# Patient Record
Sex: MALE | Race: BLACK OR AFRICAN AMERICAN | NOT HISPANIC OR LATINO | Employment: FULL TIME | URBAN - NONMETROPOLITAN AREA
[De-identification: names, ages, dates, MRNs, and addresses within clinical notes are randomized per-mention and may not be internally consistent; named-entity substitution may affect disease eponyms.]

---

## 2025-07-11 ENCOUNTER — APPOINTMENT (OUTPATIENT)
Dept: RADIOLOGY | Facility: HOSPITAL | Age: 20
End: 2025-07-11
Payer: COMMERCIAL

## 2025-07-11 ENCOUNTER — ANESTHESIA EVENT (OUTPATIENT)
Dept: OPERATING ROOM | Facility: HOSPITAL | Age: 20
End: 2025-07-11

## 2025-07-11 ENCOUNTER — HOSPITAL ENCOUNTER (OUTPATIENT)
Facility: HOSPITAL | Age: 20
Discharge: HOME | End: 2025-07-11
Attending: EMERGENCY MEDICINE
Payer: COMMERCIAL

## 2025-07-11 ENCOUNTER — HOSPITAL ENCOUNTER (OUTPATIENT)
Facility: HOSPITAL | Age: 20
Setting detail: OUTPATIENT SURGERY
End: 2025-07-11
Attending: SURGERY | Admitting: SURGERY

## 2025-07-11 ENCOUNTER — ANESTHESIA (OUTPATIENT)
Dept: OPERATING ROOM | Facility: HOSPITAL | Age: 20
End: 2025-07-11

## 2025-07-11 VITALS
RESPIRATION RATE: 15 BRPM | HEART RATE: 56 BPM | SYSTOLIC BLOOD PRESSURE: 118 MMHG | OXYGEN SATURATION: 100 % | DIASTOLIC BLOOD PRESSURE: 71 MMHG | TEMPERATURE: 99.2 F | WEIGHT: 180 LBS | BODY MASS INDEX: 24.38 KG/M2 | HEIGHT: 72 IN

## 2025-07-11 DIAGNOSIS — K35.30 ACUTE APPENDICITIS WITH LOCALIZED PERITONITIS, WITHOUT PERFORATION, ABSCESS, OR GANGRENE: Primary | ICD-10-CM

## 2025-07-11 DIAGNOSIS — G89.18 POST-OPERATIVE PAIN: Primary | ICD-10-CM

## 2025-07-11 DIAGNOSIS — K35.80 ACUTE APPENDICITIS, UNSPECIFIED ACUTE APPENDICITIS TYPE: ICD-10-CM

## 2025-07-11 LAB
ALBUMIN SERPL BCP-MCNC: 5 G/DL (ref 3.4–5)
ALP SERPL-CCNC: 69 U/L (ref 33–120)
ALT SERPL W P-5'-P-CCNC: 18 U/L (ref 10–52)
ANION GAP SERPL CALC-SCNC: 14 MMOL/L (ref 10–20)
APPEARANCE UR: CLEAR
AST SERPL W P-5'-P-CCNC: 17 U/L (ref 9–39)
BASOPHILS # BLD AUTO: 0.03 X10*3/UL (ref 0–0.1)
BASOPHILS NFR BLD AUTO: 0.3 %
BILIRUB SERPL-MCNC: 0.6 MG/DL (ref 0–1.2)
BILIRUB UR STRIP.AUTO-MCNC: NEGATIVE MG/DL
BUN SERPL-MCNC: 16 MG/DL (ref 6–23)
CALCIUM SERPL-MCNC: 9.7 MG/DL (ref 8.6–10.3)
CHLORIDE SERPL-SCNC: 104 MMOL/L (ref 98–107)
CO2 SERPL-SCNC: 25 MMOL/L (ref 21–32)
COLOR UR: ABNORMAL
CREAT SERPL-MCNC: 1.2 MG/DL (ref 0.5–1.3)
EGFRCR SERPLBLD CKD-EPI 2021: 88 ML/MIN/1.73M*2
EOSINOPHIL # BLD AUTO: 0.14 X10*3/UL (ref 0–0.7)
EOSINOPHIL NFR BLD AUTO: 1.4 %
ERYTHROCYTE [DISTWIDTH] IN BLOOD BY AUTOMATED COUNT: 12.1 % (ref 11.5–14.5)
GLUCOSE SERPL-MCNC: 107 MG/DL (ref 74–99)
GLUCOSE UR STRIP.AUTO-MCNC: NORMAL MG/DL
HCT VFR BLD AUTO: 43 % (ref 41–52)
HGB BLD-MCNC: 15.2 G/DL (ref 13.5–17.5)
IMM GRANULOCYTES # BLD AUTO: 0.03 X10*3/UL (ref 0–0.7)
IMM GRANULOCYTES NFR BLD AUTO: 0.3 % (ref 0–0.9)
KETONES UR STRIP.AUTO-MCNC: NEGATIVE MG/DL
LEUKOCYTE ESTERASE UR QL STRIP.AUTO: NEGATIVE
LYMPHOCYTES # BLD AUTO: 1.24 X10*3/UL (ref 1.2–4.8)
LYMPHOCYTES NFR BLD AUTO: 12.5 %
MCH RBC QN AUTO: 29.7 PG (ref 26–34)
MCHC RBC AUTO-ENTMCNC: 35.3 G/DL (ref 32–36)
MCV RBC AUTO: 84 FL (ref 80–100)
MONOCYTES # BLD AUTO: 0.75 X10*3/UL (ref 0.1–1)
MONOCYTES NFR BLD AUTO: 7.6 %
MUCOUS THREADS #/AREA URNS AUTO: NORMAL /LPF
NEUTROPHILS # BLD AUTO: 7.7 X10*3/UL (ref 1.2–7.7)
NEUTROPHILS NFR BLD AUTO: 77.9 %
NITRITE UR QL STRIP.AUTO: NEGATIVE
NRBC BLD-RTO: 0 /100 WBCS (ref 0–0)
PH UR STRIP.AUTO: 7.5 [PH]
PLATELET # BLD AUTO: 208 X10*3/UL (ref 150–450)
POTASSIUM SERPL-SCNC: 3.7 MMOL/L (ref 3.5–5.3)
PROT SERPL-MCNC: 8 G/DL (ref 6.4–8.2)
PROT UR STRIP.AUTO-MCNC: ABNORMAL MG/DL
RBC # BLD AUTO: 5.12 X10*6/UL (ref 4.5–5.9)
RBC # UR STRIP.AUTO: NEGATIVE MG/DL
RBC #/AREA URNS AUTO: NORMAL /HPF
SODIUM SERPL-SCNC: 139 MMOL/L (ref 136–145)
SP GR UR STRIP.AUTO: >1.05
UROBILINOGEN UR STRIP.AUTO-MCNC: NORMAL MG/DL
WBC # BLD AUTO: 9.9 X10*3/UL (ref 4.4–11.3)
WBC #/AREA URNS AUTO: NORMAL /HPF

## 2025-07-11 PROCEDURE — 2500000004 HC RX 250 GENERAL PHARMACY W/ HCPCS (ALT 636 FOR OP/ED): Performed by: ANESTHESIOLOGY

## 2025-07-11 PROCEDURE — 85025 COMPLETE CBC W/AUTO DIFF WBC: CPT | Performed by: EMERGENCY MEDICINE

## 2025-07-11 PROCEDURE — 96375 TX/PRO/DX INJ NEW DRUG ADDON: CPT | Mod: 59

## 2025-07-11 PROCEDURE — 2550000001 HC RX 255 CONTRASTS: Performed by: EMERGENCY MEDICINE

## 2025-07-11 PROCEDURE — 88304 TISSUE EXAM BY PATHOLOGIST: CPT | Performed by: PATHOLOGY

## 2025-07-11 PROCEDURE — 7100000009 HC PHASE TWO TIME - INITIAL BASE CHARGE: Performed by: SURGERY

## 2025-07-11 PROCEDURE — 88304 TISSUE EXAM BY PATHOLOGIST: CPT | Mod: TC,SUR,SAMLAB | Performed by: SURGERY

## 2025-07-11 PROCEDURE — 99285 EMERGENCY DEPT VISIT HI MDM: CPT | Mod: 25 | Performed by: EMERGENCY MEDICINE

## 2025-07-11 PROCEDURE — 3700000001 HC GENERAL ANESTHESIA TIME - INITIAL BASE CHARGE: Performed by: SURGERY

## 2025-07-11 PROCEDURE — 7100000010 HC PHASE TWO TIME - EACH INCREMENTAL 1 MINUTE: Performed by: SURGERY

## 2025-07-11 PROCEDURE — 96365 THER/PROPH/DIAG IV INF INIT: CPT | Mod: 59

## 2025-07-11 PROCEDURE — 3600000008 HC OR TIME - EACH INCREMENTAL 1 MINUTE - PROCEDURE LEVEL THREE: Performed by: SURGERY

## 2025-07-11 PROCEDURE — 3600000003 HC OR TIME - INITIAL BASE CHARGE - PROCEDURE LEVEL THREE: Performed by: SURGERY

## 2025-07-11 PROCEDURE — 44970 LAPAROSCOPY APPENDECTOMY: CPT | Performed by: SURGERY

## 2025-07-11 PROCEDURE — 7100000002 HC RECOVERY ROOM TIME - EACH INCREMENTAL 1 MINUTE: Performed by: SURGERY

## 2025-07-11 PROCEDURE — 2500000005 HC RX 250 GENERAL PHARMACY W/O HCPCS: Performed by: ANESTHESIOLOGY

## 2025-07-11 PROCEDURE — 2500000004 HC RX 250 GENERAL PHARMACY W/ HCPCS (ALT 636 FOR OP/ED): Performed by: SURGERY

## 2025-07-11 PROCEDURE — 2720000007 HC OR 272 NO HCPCS: Performed by: SURGERY

## 2025-07-11 PROCEDURE — 2500000004 HC RX 250 GENERAL PHARMACY W/ HCPCS (ALT 636 FOR OP/ED): Performed by: EMERGENCY MEDICINE

## 2025-07-11 PROCEDURE — 36415 COLL VENOUS BLD VENIPUNCTURE: CPT | Performed by: EMERGENCY MEDICINE

## 2025-07-11 PROCEDURE — 80053 COMPREHEN METABOLIC PANEL: CPT | Performed by: EMERGENCY MEDICINE

## 2025-07-11 PROCEDURE — 96361 HYDRATE IV INFUSION ADD-ON: CPT | Mod: 59

## 2025-07-11 PROCEDURE — 3700000002 HC GENERAL ANESTHESIA TIME - EACH INCREMENTAL 1 MINUTE: Performed by: SURGERY

## 2025-07-11 PROCEDURE — 2500000001 HC RX 250 WO HCPCS SELF ADMINISTERED DRUGS (ALT 637 FOR MEDICARE OP): Performed by: ANESTHESIOLOGY

## 2025-07-11 PROCEDURE — 7100000001 HC RECOVERY ROOM TIME - INITIAL BASE CHARGE: Performed by: SURGERY

## 2025-07-11 PROCEDURE — 74177 CT ABD & PELVIS W/CONTRAST: CPT | Mod: FOREIGN READ | Performed by: RADIOLOGY

## 2025-07-11 PROCEDURE — 74177 CT ABD & PELVIS W/CONTRAST: CPT

## 2025-07-11 PROCEDURE — 81003 URINALYSIS AUTO W/O SCOPE: CPT | Performed by: EMERGENCY MEDICINE

## 2025-07-11 RX ORDER — ROCURONIUM BROMIDE 10 MG/ML
INJECTION, SOLUTION INTRAVENOUS AS NEEDED
Status: DISCONTINUED | OUTPATIENT
Start: 2025-07-11 | End: 2025-07-11

## 2025-07-11 RX ORDER — GLYCOPYRROLATE 0.2 MG/ML
INJECTION INTRAMUSCULAR; INTRAVENOUS AS NEEDED
Status: DISCONTINUED | OUTPATIENT
Start: 2025-07-11 | End: 2025-07-11

## 2025-07-11 RX ORDER — LIDOCAINE HYDROCHLORIDE 20 MG/ML
INJECTION, SOLUTION INFILTRATION; PERINEURAL AS NEEDED
Status: DISCONTINUED | OUTPATIENT
Start: 2025-07-11 | End: 2025-07-11

## 2025-07-11 RX ORDER — MIDAZOLAM HYDROCHLORIDE 2 MG/2ML
2 INJECTION, SOLUTION INTRAMUSCULAR; INTRAVENOUS ONCE AS NEEDED
Status: DISCONTINUED | OUTPATIENT
Start: 2025-07-11 | End: 2025-07-11 | Stop reason: HOSPADM

## 2025-07-11 RX ORDER — SODIUM CHLORIDE, SODIUM LACTATE, POTASSIUM CHLORIDE, CALCIUM CHLORIDE 600; 310; 30; 20 MG/100ML; MG/100ML; MG/100ML; MG/100ML
125 INJECTION, SOLUTION INTRAVENOUS CONTINUOUS
Status: DISCONTINUED | OUTPATIENT
Start: 2025-07-11 | End: 2025-07-12 | Stop reason: HOSPADM

## 2025-07-11 RX ORDER — ONDANSETRON HYDROCHLORIDE 2 MG/ML
INJECTION, SOLUTION INTRAVENOUS AS NEEDED
Status: DISCONTINUED | OUTPATIENT
Start: 2025-07-11 | End: 2025-07-11

## 2025-07-11 RX ORDER — KETOROLAC TROMETHAMINE 30 MG/ML
INJECTION, SOLUTION INTRAMUSCULAR; INTRAVENOUS AS NEEDED
Status: DISCONTINUED | OUTPATIENT
Start: 2025-07-11 | End: 2025-07-11

## 2025-07-11 RX ORDER — BUPIVACAINE HYDROCHLORIDE 2.5 MG/ML
INJECTION, SOLUTION INFILTRATION; PERINEURAL AS NEEDED
Status: DISCONTINUED | OUTPATIENT
Start: 2025-07-11 | End: 2025-07-11 | Stop reason: HOSPADM

## 2025-07-11 RX ORDER — ONDANSETRON HYDROCHLORIDE 2 MG/ML
4 INJECTION, SOLUTION INTRAVENOUS ONCE
Status: COMPLETED | OUTPATIENT
Start: 2025-07-11 | End: 2025-07-11

## 2025-07-11 RX ORDER — PROPOFOL 10 MG/ML
INJECTION, EMULSION INTRAVENOUS AS NEEDED
Status: DISCONTINUED | OUTPATIENT
Start: 2025-07-11 | End: 2025-07-11

## 2025-07-11 RX ORDER — SUCCINYLCHOLINE CHLORIDE 100 MG/5ML
SYRINGE (ML) INTRAVENOUS AS NEEDED
Status: DISCONTINUED | OUTPATIENT
Start: 2025-07-11 | End: 2025-07-11

## 2025-07-11 RX ORDER — PROCHLORPERAZINE EDISYLATE 5 MG/ML
10 INJECTION INTRAMUSCULAR; INTRAVENOUS ONCE AS NEEDED
Status: DISCONTINUED | OUTPATIENT
Start: 2025-07-11 | End: 2025-07-12 | Stop reason: HOSPADM

## 2025-07-11 RX ORDER — LIDOCAINE HYDROCHLORIDE 40 MG/ML
SOLUTION TOPICAL AS NEEDED
Status: DISCONTINUED | OUTPATIENT
Start: 2025-07-11 | End: 2025-07-11

## 2025-07-11 RX ORDER — OXYCODONE HYDROCHLORIDE 5 MG/1
5 TABLET ORAL ONCE
Refills: 0 | Status: COMPLETED | OUTPATIENT
Start: 2025-07-11 | End: 2025-07-11

## 2025-07-11 RX ORDER — OXYCODONE AND ACETAMINOPHEN 5; 325 MG/1; MG/1
1 TABLET ORAL EVERY 6 HOURS PRN
Qty: 8 TABLET | Refills: 0 | Status: SHIPPED | OUTPATIENT
Start: 2025-07-11 | End: 2025-07-13

## 2025-07-11 RX ORDER — CEFOXITIN SODIUM 2 G/50ML
2 INJECTION, SOLUTION INTRAVENOUS ONCE
Status: DISCONTINUED | OUTPATIENT
Start: 2025-07-11 | End: 2025-07-11 | Stop reason: HOSPADM

## 2025-07-11 RX ORDER — NEOSTIGMINE METHYLSULFATE 1 MG/ML
INJECTION INTRAVENOUS AS NEEDED
Status: DISCONTINUED | OUTPATIENT
Start: 2025-07-11 | End: 2025-07-11

## 2025-07-11 RX ORDER — FENTANYL CITRATE 50 UG/ML
INJECTION, SOLUTION INTRAMUSCULAR; INTRAVENOUS AS NEEDED
Status: DISCONTINUED | OUTPATIENT
Start: 2025-07-11 | End: 2025-07-11

## 2025-07-11 RX ORDER — SODIUM CHLORIDE, SODIUM LACTATE, POTASSIUM CHLORIDE, CALCIUM CHLORIDE 600; 310; 30; 20 MG/100ML; MG/100ML; MG/100ML; MG/100ML
100 INJECTION, SOLUTION INTRAVENOUS CONTINUOUS
Status: DISCONTINUED | OUTPATIENT
Start: 2025-07-11 | End: 2025-07-12 | Stop reason: HOSPADM

## 2025-07-11 RX ORDER — MORPHINE SULFATE 4 MG/ML
4 INJECTION, SOLUTION INTRAMUSCULAR; INTRAVENOUS ONCE
Status: COMPLETED | OUTPATIENT
Start: 2025-07-11 | End: 2025-07-11

## 2025-07-11 RX ADMIN — ONDANSETRON 4 MG: 2 INJECTION, SOLUTION INTRAMUSCULAR; INTRAVENOUS at 21:41

## 2025-07-11 RX ADMIN — Medication 8 L/MIN: at 22:00

## 2025-07-11 RX ADMIN — IOHEXOL 68 ML: 350 INJECTION, SOLUTION INTRAVENOUS at 18:38

## 2025-07-11 RX ADMIN — MORPHINE SULFATE 4 MG: 4 INJECTION, SOLUTION INTRAMUSCULAR; INTRAVENOUS at 17:42

## 2025-07-11 RX ADMIN — SODIUM CHLORIDE, SODIUM LACTATE, POTASSIUM CHLORIDE, AND CALCIUM CHLORIDE 100 ML/HR: .6; .31; .03; .02 INJECTION, SOLUTION INTRAVENOUS at 20:52

## 2025-07-11 RX ADMIN — Medication 100 MG: at 20:59

## 2025-07-11 RX ADMIN — PROPOFOL 150 MG: 10 INJECTION, EMULSION INTRAVENOUS at 20:59

## 2025-07-11 RX ADMIN — HYDROMORPHONE HYDROCHLORIDE 0.5 MG: 2 INJECTION, SOLUTION INTRAMUSCULAR; INTRAVENOUS; SUBCUTANEOUS at 21:34

## 2025-07-11 RX ADMIN — KETOROLAC TROMETHAMINE 30 MG: 30 INJECTION, SOLUTION INTRAMUSCULAR; INTRAVENOUS at 21:38

## 2025-07-11 RX ADMIN — ROCURONIUM BROMIDE 10 MG: 10 INJECTION INTRAVENOUS at 21:13

## 2025-07-11 RX ADMIN — ONDANSETRON 4 MG: 2 INJECTION INTRAMUSCULAR; INTRAVENOUS at 17:40

## 2025-07-11 RX ADMIN — LIDOCAINE HYDROCHLORIDE 50 MG: 20 INJECTION, SOLUTION INFILTRATION; PERINEURAL at 20:56

## 2025-07-11 RX ADMIN — ROCURONIUM BROMIDE 20 MG: 10 INJECTION INTRAVENOUS at 21:08

## 2025-07-11 RX ADMIN — HYDROMORPHONE HYDROCHLORIDE 0.5 MG: 2 INJECTION, SOLUTION INTRAMUSCULAR; INTRAVENOUS; SUBCUTANEOUS at 21:15

## 2025-07-11 RX ADMIN — GLYCOPYRROLATE 0.2 MG: 0.2 INJECTION, SOLUTION INTRAMUSCULAR; INTRAVENOUS at 21:45

## 2025-07-11 RX ADMIN — LIDOCAINE HYDROCHLORIDE 4 ML: 40 SOLUTION TOPICAL at 21:00

## 2025-07-11 RX ADMIN — OXYCODONE HYDROCHLORIDE 5 MG: 5 TABLET ORAL at 22:40

## 2025-07-11 RX ADMIN — FENTANYL CITRATE 100 MCG: 50 INJECTION, SOLUTION INTRAMUSCULAR; INTRAVENOUS at 20:56

## 2025-07-11 RX ADMIN — NEOSTIGMINE METHYLSULFATE 2 MG: 1 INJECTION, SOLUTION INTRAVENOUS at 21:45

## 2025-07-11 RX ADMIN — ROCURONIUM BROMIDE 10 MG: 10 INJECTION INTRAVENOUS at 20:59

## 2025-07-11 RX ADMIN — PIPERACILLIN SODIUM AND TAZOBACTAM SODIUM 3.38 G: 3; .375 INJECTION, SOLUTION INTRAVENOUS at 19:00

## 2025-07-11 RX ADMIN — SODIUM CHLORIDE 1000 ML: 0.9 INJECTION, SOLUTION INTRAVENOUS at 17:39

## 2025-07-11 SDOH — HEALTH STABILITY: MENTAL HEALTH: CURRENT SMOKER: 0

## 2025-07-11 ASSESSMENT — PAIN SCALES - GENERAL
PAINLEVEL_OUTOF10: 0 - NO PAIN
PAINLEVEL_OUTOF10: 7
PAINLEVEL_OUTOF10: 0 - NO PAIN
PAINLEVEL_OUTOF10: 7
PAINLEVEL_OUTOF10: 0 - NO PAIN

## 2025-07-11 ASSESSMENT — PAIN - FUNCTIONAL ASSESSMENT
PAIN_FUNCTIONAL_ASSESSMENT: 0-10

## 2025-07-11 ASSESSMENT — PAIN DESCRIPTION - LOCATION: LOCATION: ABDOMEN

## 2025-07-11 NOTE — ED PROVIDER NOTES
HPI   No chief complaint on file.      Limitations to History: None    HPI: 20-year-old male presents concern for lower abdominal pain that began approximately 6 hours ago.  Sharp in nature.  Primarily in his right lower quadrant.  1 episode of vomiting.  Nausea.  Denies any changes in urinary or bowel habits.  Denies any fall or trauma.    Additional History Obtained from: Girlfriend at bedside.    ------------------------------------------------------------------------------------------------------------------------------------------  Physical Exam:    VS: As documented in the triage note and EMR flowsheet from this visit were reviewed.    Appearance: Alert. cooperative,  in no acute distress.   Skin: Intact,  dry skin, no lesions, rash, petechiae or purpura.   HENT: Normocephalic, atraumatic. Nares patent. No intraoral lesions.   Neck: Supple, without meningismus. Trachea at midline. No lymphadenopathy.  Pulmonary: Clear bilaterally with good chest wall excursion. No rales, rhonchi or wheezing. No accessory muscle use or stridor.  Cardiac: Regular rate and rhythm, no rubs, murmurs, or gallops.   Abdomen: Abdomen is soft.  Tenderness to palpation in the right lower quadrant and suprapubic area.  Nondistended.  No palpable organomegaly.  No rebound or guarding.  No CVA tenderness. Nonsurgical abdomen.  Genitourinary: Exam deferred.  Musculoskeletal: Full range of motion.  Pulses full and equal. No cyanosis, clubbing, or edema.  Psychiatric: Appropriate mood and affect.                Patient History   Medical History[1]  Surgical History[2]  Family History[3]  Social History[4]    Physical Exam   ED Triage Vitals   Temp Pulse Resp BP   -- -- -- --      SpO2 Temp src Heart Rate Source Patient Position   -- -- -- --      BP Location FiO2 (%)     -- --       Physical Exam      ED Course & MDM   Diagnoses as of 07/12/25 1439   Acute appendicitis, unspecified acute appendicitis type                 No data recorded                                  Medical Decision Making  Labs Reviewed  COMPREHENSIVE METABOLIC PANEL - Abnormal     Glucose                       107 (*)                Sodium                        139                    Potassium                     3.7                    Chloride                      104                    Bicarbonate                   25                     Anion Gap                     14                     Urea Nitrogen                 16                     Creatinine                    1.20                   eGFR                          88                     Calcium                       9.7                    Albumin                       5.0                    Alkaline Phosphatase          69                     Total Protein                 8.0                    AST                           17                     Bilirubin, Total              0.6                    ALT                           18                  URINALYSIS WITH REFLEX CULTURE AND MICROSCOPIC - Abnormal     Color, Urine                                         Appearance, Urine             Clear                  Specific Gravity, Urine       >1.050 (*)               pH, Urine                     7.5                    Protein, Urine                20 (TRACE)                Glucose, Urine                Normal                 Blood, Urine                  NEGATIVE                Ketones, Urine                NEGATIVE                Bilirubin, Urine              NEGATIVE                Urobilinogen, Urine           Normal                 Nitrite, Urine                NEGATIVE                Leukocyte Esterase, Urine     NEGATIVE             CBC WITH AUTO DIFFERENTIAL     WBC                           9.9                    nRBC                          0.0                    RBC                           5.12                   Hemoglobin                    15.2                   Hematocrit                    43.0                    MCV                           84                     MCH                           29.7                   MCHC                          35.3                   RDW                           12.1                   Platelets                     208                    Neutrophils %                 77.9                   Immature Granulocytes %, Automated   0.3                    Lymphocytes %                 12.5                   Monocytes %                   7.6                    Eosinophils %                 1.4                    Basophils %                   0.3                    Neutrophils Absolute          7.70                   Immature Granulocytes Absolute, Au*   0.03                   Lymphocytes Absolute          1.24                   Monocytes Absolute            0.75                   Eosinophils Absolute          0.14                   Basophils Absolute            0.03                URINALYSIS WITH REFLEX CULTURE AND MICROSCOPIC         Narrative: The following orders were created for panel order Urinalysis with Reflex Culture and Microscopic.                  Procedure                               Abnormality         Status                                     ---------                               -----------         ------                                     Urinalysis with Reflex C...[[id number]]  Abnormal            Final result                               Extra Urine Gray Tube[[id number]]                                                                                         Please view results for these tests on the individual orders.  EXTRA URINE GRAY TUBE  SURGICAL PATHOLOGY EXAM  URINALYSIS MICROSCOPIC WITH REFLEX CULTURE  CT abdomen pelvis w IV contrast   Final Result    Findings consistent with uncomplicated acute appendicitis.     NOTIFICATION:  The critical results of the study were discussed with,    and acknowledged by Duke Esteban  by telephone on July 11, 2025 at     2000 hours.    Signed by Ras Craft MD     Medical Decision Making:    Patient appears in distress secondary to pain.  Treated with 1 L normal saline, morphine, Zofran.  Lab work shows no evidence of leukocytosis.  Afebrile.  CT interpreted by myself with concern for acute appendicitis.  Case discussed with general surgeon on-call, Dr. Strauss.  Patient treated with Zosyn.  Examined patient at the bedside.  Awaiting radiology interpretation but plan for appendectomy.  Stable.    Differential Diagnoses Considered: Acute appendicitis, UTI, kidney stone, musculoskeletal pain    Independent Interpretation of Studies:  I independently interpreted: CT shows concern for acute appendicitis with appendicolith.    Escalation of Care:  Appropriate for transfer the operating room for appendectomy.    Discussion of Management with Other Providers:   I discussed the patient/results with: General Surgeon.          Procedure  Procedures       [1] No past medical history on file.  [2] No past surgical history on file.  [3] No family history on file.  [4]   Social History  Tobacco Use    Smoking status: Not on file    Smokeless tobacco: Not on file   Substance Use Topics    Alcohol use: Not on file    Drug use: Not on file        Geo Harden,   07/12/25 2613

## 2025-07-11 NOTE — H&P
General Surgery H&P    Patient: Guy Holt  : 2005  MRN: [id number]  Date: 25    Primary Care Provider: No PCP  Referring Provider: Geo Florentino DO    Chief Complaint: RLQ abdominal pain    History of Present Illness: Guy Holt is a 20 y.o. old male seen at the request of Dr. Florentino for evaluation of acute appendicitis.  He presents to the emergency department with right lower quadrant abdominal pain.  Pain began around noon.  He vomited around 1 PM.  He denies any fever, sweats or chills.  He denies any similar pain in the past.  Pain is worse with movement.  He last ate prior to onset of pain.  He had chicken nuggets at 7 AM and a couple coffee at 9 AM.  He is otherwise healthy.  He has no known medical issues.  He has no previous abdominal surgery.  His BMI is 24.    Medical History:  No known medical issues    Surgical History:  No previous abdominal surgery.  Knee surgery    Home Medications:  No home medications.    Allergies:  No known allergies.    Family History:   No family history of colon or rectal cancer or inflammatory bowel disease.    Social History:  Vapes.  Social alcohol use.  No drug use.  He works in [employer], but his job does not involve much heavy lifting.    ROS:  Constitutional:  no fever, sweats, and chills  Cardiovascular: No chest pain  Respiratory: No cough or shortness of breath  Gastrointestinal: + RLQ abdominal pain that is worse with movement, vomiting  Genitourinary: no dysuria  Musculoskeletal: no weakness or swelling  Integumentary: no rashes  Neurological: no confusion  Endocrine: no heat or cold intolerance  Heme/Lymph: no easy bruising or bleeding    Objective:  /82   Pulse 66   Temp 36.4 °C (97.5 °F)   Resp 16   Wt 81.6 kg (180 lb)   SpO2 99%     Physical Exam:  Constitutional: No acute distress although appears uncomfortable, mother and girlfriend at bedside  Neurologic: alert and oriented  Psych: appropriate affect  Ears, Nose,  Mouth and Throat: mucus membranes moist  Pulmonary: No labored breathing, on room air  Cardiovascular: Regular rate and rhythm  Abdomen: soft, non-distended, BMI 24, no surgical scars, focal peritonitis in the right lower quadrant  Musculoskeletal: Moves all extremities, no edema  Skin: no jaundice    Labs:  WBC 9.9, Hgb 15.2, Plts 208  CMP within normal limits    Imaging:  CT ap reviewed: Dilated appendix with some hyperemia of the wall, with fecaliths, consistent with early acute appendicitis.    Assessment and Plan: Guy Holt is a 20 y.o. old male with acute appendicitis.  Risks, benefits and alternatives of laparoscopic appendectomy were discussed with the patient.  This included risk of bleeding, infection, viscus injury, incisional hernia, conversion to an open procedure, post-operative abscess, possible need for subsequent interventions.  He was agreeable to proceed with surgery.  He is NPO.  He received Zosyn in the Emergency Department.  Anticipate discharge home this evening following surgery.    Darya Strauss MD  7/11/2025

## 2025-07-12 NOTE — ED PROVIDER NOTES
Emergency Medicine Transition of Care Note.    I received Guy Holt in signout from Dr. Carlson.  Please see the previous ED provider note for all HPI, PE and MDM up to the time of signout at 1900. This is in addition to the primary record.  I spoke with Dr. Strauss.  Patient to go the OR for appendectomy and be discharged home.    In brief Guy Holt is an 20 y.o. male presenting for   Chief Complaint   Patient presents with    Abdominal Pain     Abdominal pain, nausea and vomiting.      At the time of signout we were awaiting: CT scan    Diagnoses as of 07/12/25 0540   Acute appendicitis, unspecified acute appendicitis type     Labs Reviewed   COMPREHENSIVE METABOLIC PANEL - Abnormal       Result Value    Glucose 107 (*)     Sodium 139      Potassium 3.7      Chloride 104      Bicarbonate 25      Anion Gap 14      Urea Nitrogen 16      Creatinine 1.20      eGFR 88      Calcium 9.7      Albumin 5.0      Alkaline Phosphatase 69      Total Protein 8.0      AST 17      Bilirubin, Total 0.6      ALT 18     CBC WITH AUTO DIFFERENTIAL    WBC 9.9      nRBC 0.0      RBC 5.12      Hemoglobin 15.2      Hematocrit 43.0      MCV 84      MCH 29.7      MCHC 35.3      RDW 12.1      Platelets 208      Neutrophils % 77.9      Immature Granulocytes %, Automated 0.3      Lymphocytes % 12.5      Monocytes % 7.6      Eosinophils % 1.4      Basophils % 0.3      Neutrophils Absolute 7.70      Immature Granulocytes Absolute, Automated 0.03      Lymphocytes Absolute 1.24      Monocytes Absolute 0.75      Eosinophils Absolute 0.14      Basophils Absolute 0.03     URINALYSIS WITH REFLEX CULTURE AND MICROSCOPIC    Narrative:     The following orders were created for panel order Urinalysis with Reflex Culture and Microscopic.  Procedure                               Abnormality         Status                     ---------                               -----------         ------                     Urinalysis with Reflex  C...[[id number]]                                                 Extra Urine Gray Tube[[id number]]                                                         Please view results for these tests on the individual orders.   URINALYSIS WITH REFLEX CULTURE AND MICROSCOPIC   EXTRA URINE GRAY TUBE      CT abdomen pelvis w IV contrast   Final Result   Findings consistent with uncomplicated acute appendicitis.    NOTIFICATION:  The critical results of the study were discussed with,   and acknowledged by Duke Esteban  by telephone on July 11, 2025 at   2000 hours.   Signed by Ras Craft MD         Medical Decision Making  The patient will be taken to the OR for an appendectomy and discharged per Dr. Strauss.    Final diagnoses:   [K35.30] Acute appendicitis with localized peritonitis, without perforation, abscess, or gangrene           Procedure  Procedures    DO Duke Armstrong DO  07/2005       Duke Ayala DO  07/12/25 0564

## 2025-07-12 NOTE — ANESTHESIA PROCEDURE NOTES
Airway  Date/Time: 7/11/2025 9:00 PM  Reason: elective    Airway not difficult    Staffing  Performed: attending   Authorized by: Tray Oconnor MD PhD    Performed by: Tray Oconnor MD PhD  Patient location during procedure: OR    Patient Condition  Indications for airway management: anesthesia and airway protection  MILS maintained throughout  Sedation level: deep     Final Airway Details   Preoxygenated: yes  Final airway type: endotracheal airway  Successful airway: ETT  Cuffed: yes   Successful intubation technique: direct laryngoscopy  Adjuncts used in placement: intubating stylet  Endotracheal tube insertion site: oral  Blade: Diana  Blade size: #4  ETT size (mm): 7.5  Cormack-Lehane Classification: grade IIa - partial view of glottis  Placement verified by: chest auscultation and capnometry   Number of attempts at approach: 1

## 2025-07-12 NOTE — OP NOTE
[facility]  Operative Report    Patient: Guy Holt  : 2005  MRN: [id number]    Date of Operation: 25    Pre-Operative Diagnosis: Acute appendicitis  Post-Operative Diagnosis: Acute appendicitis  Procedure: Laparoscopic Appendectomy    Surgeon: Darya Strauss MD  Assistant: n/a    Anesthesia: General  Findings: Acute appendicitis  EBL: 15 ml  Specimen: Appendix  Case Type: Clean-Contaminated  Disposition: PACU / Stable    Indication: Patient is a 20 y.o. male who presented with right lower quadrant abdominal pain that began earlier in the day.  CT scan showed acute appendicitis. Risks and benefits of appendectomy were discussed and the patient was agreeable to proceed with surgery.    Description: The patient was brought to the operating room and placed in supine position with left arm tucked. General anesthesia was induced. The patient's abdomen was prepped and draped. A Veress needle was placed in the left upper quadrant.  Attempted insufflation was made but this had high pressure consistent with preperitoneal placement.  The Veress needle attempt was then aborted.  An infraumbilical incision was created and a 5mm optical trocar was introduced. There was no evidence of injury at the trocar site.  The Veress site was confirmed to be preperitoneal.  The camera was changed to a 30 degree scope. The patient was placed in Trendelenburg position. A 12mm left lower quadrant and 5mm suprapubic port were placed under direct visualization after local anesthetic infiltration. The patient was rotated to the left.  The appendix was identified.  It was actually partially twisted around its own mesentery with dilation distal to where it was twisted.  The tip was very dilated and positioned towards the pelvis.  The appendix was grasped, straightened, and retracted. A window was created in the base of the mesentery using blunt dissection. The base of the appendix was transected with a blue load  of an Endo JOHN stapler. The mesentery was then transected with a white load of an Endo JOHN stapler.  He had some oozing from the staple line.  Judicious use of cautery was used for hemostasis.  The appendix was removed via Endo Catch bag and the 12mm port was closed with 0 Vicryl on a laparoscopic suture passer. Skin incisions were closed with 4-0 Vicryl and steri strips. The patient tolerated the procedure well, was extubated and transferred to PACU in stable condition.    Darya Strauss MD  7/11/2025

## 2025-07-12 NOTE — ANESTHESIA POSTPROCEDURE EVALUATION
Patient: Guy Holt    Procedure Summary       Date: 07/11/25 Room / Location: [facility] OR 03 / [facility] OR    Anesthesia Start: 2051 Anesthesia Stop: 2208    Procedure: Laparoscopic appendectomy, possible open (Abdomen) Diagnosis:       Acute appendicitis with localized peritonitis, without perforation, abscess, or gangrene      (Acute appendicitis with localized peritonitis, without perforation, abscess, or gangrene [K35.30])    Surgeons: Darya Strauss MD Responsible Provider: Tray Oconnor MD PhD    Anesthesia Type: general ASA Status: 1 - Emergent            Anesthesia Type: general    Vitals Value Taken Time   /65 07/11/25 22:30   Temp 37.3 °C (99.2 °F) 07/11/25 22:30   Pulse 57 07/11/25 22:30   Resp 17 07/11/25 22:30   SpO2 98 % 07/11/25 22:30       Anesthesia Post Evaluation    Patient location during evaluation: PACU  Patient participation: complete - patient participated  Level of consciousness: awake and alert  Pain management: satisfactory to patient  Airway patency: patent  Cardiovascular status: hemodynamically stable  Respiratory status: spontaneous ventilation, room air and nonlabored ventilation  Hydration status: euvolemic  Postoperative Nausea and Vomiting: none        No notable events documented.

## 2025-07-12 NOTE — ANESTHESIA PREPROCEDURE EVALUATION
Patient: Guy Holt    Procedure Information       Date/Time: 07/11/25 2045    Procedure: Laparoscopic appendectomy, possible open (Abdomen)    Location: [facility] OR 03 / [facility] OR    Surgeons: Darya Strauss MD            Relevant Problems   Anesthesia (within normal limits)      Cardiac (within normal limits)      Pulmonary   (-) Asthma   (-) Chronic obstructive pulmonary disease (Multi)   (-) TOD (obstructive sleep apnea)   (-) Recent URI      /Renal   (-) Chronic renal insufficiency   (-) ESRD (end stage renal disease) (Multi)   (-) Hemodialysis patient   (-) History of kidney transplant (HHS-HCC)   (-) Patient on peritoneal dialysis      Endocrine   (-) Diabetes mellitus type 1 (Multi)   (-) Diabetes mellitus, type 2 (Multi)   (-) Hyperthyroidism   (-) Hypothyroidism   (-) Obesity      Hematology   (-) Anticoagulant long-term use   (-) Coagulopathy (Multi)       Clinical information reviewed:   Tobacco  Allergies  Meds   Med Hx  Surg Hx   Fam Hx  Soc Hx        NPO Detail:  No data recorded     Physical Exam    Airway  Mallampati: I  TM distance: >3 FB  Mouth opening: 3 or more finger widths     Cardiovascular   Rhythm: regular  Rate: normal     Dental - normal exam     Pulmonary Breath sounds clear to auscultation     Abdominal            Anesthesia Plan    History of general anesthesia?: yes  History of complications of general anesthesia?: no    ASA 1 - emergent     general     The patient is not a current smoker.  Patient did not smoke on day of procedure.    intravenous induction   Anesthetic plan and risks discussed with patient.

## 2025-07-12 NOTE — DISCHARGE INSTRUCTIONS
HAD OXYCODONE AT 10:40 PM, MAY TAKE PRESCRIPTION PAIN MEDICATION AFTER 4:40 AM. SEE GENERAL ANESTHESIA AND CARE AFTER A LAPAROSCOPIC APPENDECTOMY FORMS PROVIDED. HAD OXYCODONE AT    PM, MAY TAKE AFTER       PM IF NEEDED.

## 2025-07-14 ENCOUNTER — TELEPHONE (OUTPATIENT)
Dept: SURGERY | Facility: CLINIC | Age: 20
End: 2025-07-14

## 2025-07-25 LAB
LABORATORY COMMENT REPORT: NORMAL
PATH REPORT.FINAL DX SPEC: NORMAL
PATH REPORT.GROSS SPEC: NORMAL
PATH REPORT.RELEVANT HX SPEC: NORMAL
PATH REPORT.TOTAL CANCER: NORMAL

## 2025-07-28 ENCOUNTER — APPOINTMENT (OUTPATIENT)
Dept: SURGERY | Facility: CLINIC | Age: 20
End: 2025-07-28

## 2025-07-28 DIAGNOSIS — Z48.89 POSTOPERATIVE VISIT: Primary | ICD-10-CM

## 2025-07-28 PROCEDURE — 99024 POSTOP FOLLOW-UP VISIT: CPT | Performed by: SURGERY

## 2025-07-28 NOTE — PROGRESS NOTES
General Surgery Post-Operative Visit    Patient: Guy Holt  : 2005  MRN: [id number]  Date of Visit: 25    Chief Complaint: s/p laparoscopic appendectomy    History of Present Illness: Guy Holt is a 20 y.o. old male s/p laparoscopic appendectomy on 25 for acute appendicitis.  Surgical pathology showed marked acute appendicitis with transmural suppurative inflammation and serosal fibroinflammatory reaction. Since surgery, he is doing well.  His abdominal pain has resolved.  He denies any fever, nausea or vomiting.  He is tolerating a regular diet and having regular bowel movements.    Home Medications:  None    Allergies:  NKDA    ROS:  No fever, redness or drainage from incisions  No significant abdominal pain  No nausea or vomiting    Physical Exam:  No physical exam performed as this was a virtual telephone follow-up appointment.    Assessment and Plan: Guy Holt is a 20 y.o. old male s/p laparoscopic appendectomy.  He is doing well without any symptoms of postoperative complication.  He will avoid any heavy lifting for another 2 weeks to minimize risk of incisional hernia, but may otherwise resume regular activity.  He will follow-up as needed.    Darya Strauss MD  2025

## (undated) DEVICE — Device

## (undated) DEVICE — MASK, FACE, ANESTHESIA, ADULT LARGE, INFL PORT, LF

## (undated) DEVICE — NEEDLE, ECLIPSE, 25GA X 1-1/2 IN

## (undated) DEVICE — TUBE SET, PNEUMOCLEAR, SMOKE EVACU, HIGH-FLOW

## (undated) DEVICE — TROCAR, OPTICAL, BLADELESS, 12MM, THREADED, 100MM LENGTH

## (undated) DEVICE — NEEDLE, INSUFFLATION, 13GAX120MM, DISP

## (undated) DEVICE — SYRINGE, 10 ML, 21 G X 1 0.5 IN, LUER LOK

## (undated) DEVICE — TROCAR, KII OPTICAL BLADELESS 5MM Z THREAD 100MM LNGTH

## (undated) DEVICE — STAPLER, ENDO ECHELON 45MM RELOAD, WHITE, REUSABLE

## (undated) DEVICE — SOLUTION, IRRIGATION, 0.9% SODIUM CHLORIDE, 1000 ML, HANG BOTTLE

## (undated) DEVICE — CIRCUIT, BREATHING, ADULT, B/V FILTER, HMEF, 3 L BAG, 108 IN

## (undated) DEVICE — STAPLER, ECHELON 3000, 45MM STD

## (undated) DEVICE — GLOVE, PROTEXIS PI CLASSIC, SZ-6.5, PF, LF

## (undated) DEVICE — GLOVE, SURGICAL, PROTEXIS PI BLUE W/NEUTHERA, 6.5, PF, LF

## (undated) DEVICE — STRAP, KNEE AND BODY, SINGLE USE

## (undated) DEVICE — DRAPE, SURGICAL, LAP CHOLY, 76 X 120

## (undated) DEVICE — APPLICATOR, CHLORAPREP, W/ORANGE TINT, 26ML

## (undated) DEVICE — SUTURE, VICRYL, 4-0, 18 IN, UNDYED BR PS-2

## (undated) DEVICE — STRIP, SKIN CLOSURE, STERI STRIP, REINFORCED, 0.5 X 4 IN

## (undated) DEVICE — STRAP, ARMBOARD, 3IN, BLUE/WHITE, SECURE HOOK

## (undated) DEVICE — STAPLER,  ENDO ECHELON 45MM RELOAD, BLUE

## (undated) DEVICE — BLANKET, HYPERTHERMIA, UPPER BODY

## (undated) DEVICE — RETRIEVAL SYSTEM, MONARCH, 10MM DISP ENDOSCOPIC